# Patient Record
(demographics unavailable — no encounter records)

---

## 2024-12-16 NOTE — RISK ASSESSMENT
[Has anyone in your immediate family (parents, grandparents, siblings) or other more distant relatives (aunts, uncles, cousins)  of heart] : Has anyone in your immediate family (parents, grandparents, siblings) or other more distant relatives (aunts, uncles, cousins)  of heart problems or had an unexpected sudden death before age 50 (This would include unexpected drownings, unexplained car accidents in which the relative was driving or sudden infant death syndrome.)? No [Are you related to anyone with hypertrophic cardiomyopathy or hypertrophic obstructive cardiomyopathy, Marfan syndrome, arrhythmogenic] : Are you related to anyone with hypertrophic cardiomyopathy or hypertrophic obstructive cardiomyopathy, Marfan syndrome, arrhythmogenic right ventricular cardiomyopathy, long QT syndrome, short QT syndrome, Brugada syndrome or catecholaminergic polymorphic ventricular tachycardia, or anyone younger than 50 years with a pacemaker or implantable defibrillator? No

## 2024-12-16 NOTE — DISCUSSION/SUMMARY
[Normal Growth] : growth [Normal Development] : development  [No Elimination Concerns] : elimination [Continue Regimen] : feeding [No Skin Concerns] : skin [Normal Sleep Pattern] : sleep [None] : no medical problems [] : The components of the vaccine(s) to be administered today are listed in the plan of care. The disease(s) for which the vaccine(s) are intended to prevent and the risks have been discussed with the caretaker.  The risks are also included in the appropriate vaccination information statements which have been provided to the patient's caregiver.  The caregiver has given consent to vaccinate. [FreeTextEntry1] : Family Wellness Screen was administered. No needs were identified. Total score 0.  SANJEEV ORTIZ is a 10 year old coming in for a WCC.  He is otherwise healthy and doing well.

## 2024-12-16 NOTE — HISTORY OF PRESENT ILLNESS
[Mother] : mother [Fruit] : fruit [Vegetables] : vegetables [Dairy] : dairy [Eats healthy meals and snacks] : eats healthy meals and snacks [Eats meals with family] : eats meals with family [Normal] : Normal [In own bed] : In own bed [Wakes up at night] : wakes up at night [Flossing teeth] : flossing teeth [Yes] : Patient goes to dentist yearly [Toothpaste] : Primary Fluoride Source: Toothpaste [Playtime (60 min/d)] : playtime 60 min a day [< 2 hrs of screen time per day] : less than 2 hrs of screen time per day [Participates in after-school activities] : participates in after-school activities [Appropiate parent-child-sibling interaction] : appropriate parent-child-sibling interaction [Does chores when asked] : does chores when asked [Has Friends] : has friends [Has chance to make own decisions] : has chance to make own decisions [Grade ___] : Grade [unfilled] [Parent/teacher concerns] : parent/teacher concerns [Adequate social interactions] : adequate social interactions [Adequate behavior] : adequate behavior [Adequate performance] : adequate performance [Adequate attention] : adequate attention [No] : No cigarette smoke exposure [Supervised outdoor play] : supervised outdoor play [Supervised around water] : supervised around water [Wears helmet and pads] : wears helmet and pads [Parent discusses safety rules regarding adults] : parent discusses safety rules regarding adults [Family discusses home emergency plan] : family discusses home emergency plan [Monitored computer use] : monitored computer use [Up to date] : Up to date [NO] : No [Exposure to alcohol] : no exposure to alcohol [Exposure to tobacco] : no exposure to tobacco [Exposure to electronic nicotine delivery system] : No exposure to electronic nicotine delivery system [Exposure to illicit drugs] : no exposure to illicit drugs [Appropriately restrained in motor vehicle] : not appropriately restrained in motor vehicle [FreeTextEntry7] : 5th grade now. No illness or hospitalizations. [de-identified] : None